# Patient Record
Sex: FEMALE | URBAN - METROPOLITAN AREA
[De-identification: names, ages, dates, MRNs, and addresses within clinical notes are randomized per-mention and may not be internally consistent; named-entity substitution may affect disease eponyms.]

---

## 2021-02-25 ENCOUNTER — OFFICE VISIT (OUTPATIENT)
Dept: PODIATRY | Facility: CLINIC | Age: 51
End: 2021-02-25
Payer: COMMERCIAL

## 2021-02-25 VITALS
BODY MASS INDEX: 28.2 KG/M2 | DIASTOLIC BLOOD PRESSURE: 100 MMHG | WEIGHT: 165.2 LBS | HEART RATE: 71 BPM | SYSTOLIC BLOOD PRESSURE: 156 MMHG | HEIGHT: 64 IN

## 2021-02-25 DIAGNOSIS — Q66.70 PES CAVUS, CONGENITAL: ICD-10-CM

## 2021-02-25 DIAGNOSIS — M20.12 HALLUX VALGUS OF LEFT FOOT: Primary | ICD-10-CM

## 2021-02-25 DIAGNOSIS — M77.42 METATARSALGIA OF LEFT FOOT: ICD-10-CM

## 2021-02-25 PROCEDURE — 99203 OFFICE O/P NEW LOW 30 MIN: CPT | Performed by: PODIATRIST

## 2021-02-25 RX ORDER — LEVOTHYROXINE SODIUM 88 UG/1
88 TABLET ORAL DAILY
COMMUNITY
Start: 2021-02-05

## 2021-02-25 RX ORDER — DIPHENOXYLATE HYDROCHLORIDE AND ATROPINE SULFATE 2.5; .025 MG/1; MG/1
1 TABLET ORAL DAILY
COMMUNITY

## 2021-02-25 RX ORDER — VALACYCLOVIR HYDROCHLORIDE 500 MG/1
500 TABLET, FILM COATED ORAL 3 TIMES DAILY
COMMUNITY
Start: 2021-02-07

## 2021-02-25 NOTE — PROGRESS NOTES
Assessment/Plan:      I personally reviewed the x-rays of the left foot  They reveal hallux valgus deformity along with a pes cavus foot type  Explained to patient that it is the pes cavus foot type that is causing her pain  Recommended that she try Spenco soft arch supports that would be full length  Also, [employer]  brand running shoes or hiking shoes  Should her discomfort persist, accommodative orthotics would be recommended  No problem-specific Assessment & Plan notes found for this encounter  Diagnoses and all orders for this visit:    Hallux valgus of left foot  -     XR foot 2 vw left; Future    Metatarsalgia of left foot  -     XR foot 2 vw left; Future    Pes cavus, congenital    Other orders  -     levothyroxine 88 mcg tablet; Take 88 mcg by mouth daily  -     multivitamin (THERAGRAN) TABS; Take 1 tablet by mouth daily  -     valACYclovir (VALTREX) 500 mg tablet; Take 500 mg by mouth 3 (three) times a day          Subjective:      Patient ID: Pito aJcobson is a 44 y o  female  HPI      Patient, a 47-year-old female in good health presents with intermittent pain in her left forefoot  Patient states that she is an avid walker and hiker  With extended walking she gets pain in the ball of the left foot that seems to radiate into the toes  At times she feels as if she is walking on a ball on the left foot  Patient has a bunion on the left foot but states that it is asymptomatic  No right foot discomfort is related  This left foot discomfort has been present for years  No trauma related  The following portions of the patient's history were reviewed and updated as appropriate: allergies, current medications, past family history, past medical history, past social history, past surgical history and problem list     Review of Systems   Constitutional: Negative  Respiratory: Negative  Cardiovascular: Negative  Gastrointestinal: Negative  Musculoskeletal: Negative  Neurological: Negative  Objective:      /100   Pulse 71   Ht 5' 4" (1 626 m) Comment: verbal  Wt 74 9 kg (165 lb 3 2 oz)   BMI 28 36 kg/m²          Physical Exam  Constitutional:       Appearance: Normal appearance  Cardiovascular:      Pulses: Normal pulses  Musculoskeletal:         General: Deformity present  Comments: Pes cavus foot type bilateral   Hallux valgus deformity left foot  No pain with palpation lesser metatarsals  Skin:     General: Skin is warm  Neurological:      General: No focal deficit present  Mental Status: She is oriented to person, place, and time